# Patient Record
Sex: FEMALE | URBAN - METROPOLITAN AREA
[De-identification: names, ages, dates, MRNs, and addresses within clinical notes are randomized per-mention and may not be internally consistent; named-entity substitution may affect disease eponyms.]

---

## 2024-09-30 ENCOUNTER — OFFICE VISIT (OUTPATIENT)
Dept: URBAN - METROPOLITAN AREA CLINIC 48 | Facility: CLINIC | Age: 44
End: 2024-09-30

## 2024-09-30 NOTE — HPI-TODAY'S VISIT:
44-year-old female presents for evaluation possible H. pylori infection?  No labs, imaging, procedure notes available at this time.